# Patient Record
Sex: FEMALE | Race: BLACK OR AFRICAN AMERICAN | Employment: UNEMPLOYED | URBAN - METROPOLITAN AREA
[De-identification: names, ages, dates, MRNs, and addresses within clinical notes are randomized per-mention and may not be internally consistent; named-entity substitution may affect disease eponyms.]

---

## 2024-04-24 PROBLEM — D64.9 SYMPTOMATIC ANEMIA: Status: ACTIVE | Noted: 2024-04-24

## 2024-04-25 ENCOUNTER — TELEPHONE (OUTPATIENT)
Age: 79
End: 2024-04-25

## 2024-04-25 DIAGNOSIS — D51.3 OTHER DIETARY VITAMIN B12 DEFICIENCY ANEMIA: Primary | ICD-10-CM

## 2024-04-25 NOTE — TELEPHONE ENCOUNTER
Called spoke with son Good regarding upcoming opic/ov. Advised time date and location as well as c/b #.

## 2024-04-26 ENCOUNTER — ANESTHESIA EVENT (OUTPATIENT)
Facility: HOSPITAL | Age: 79
End: 2024-04-26
Payer: MEDICARE

## 2024-04-26 ENCOUNTER — ANESTHESIA (OUTPATIENT)
Facility: HOSPITAL | Age: 79
End: 2024-04-26
Payer: MEDICARE

## 2024-04-26 PROCEDURE — 6360000002 HC RX W HCPCS: Performed by: NURSE ANESTHETIST, CERTIFIED REGISTERED

## 2024-04-26 PROCEDURE — 2500000003 HC RX 250 WO HCPCS: Performed by: NURSE ANESTHETIST, CERTIFIED REGISTERED

## 2024-04-26 RX ORDER — LIDOCAINE HYDROCHLORIDE 20 MG/ML
INJECTION, SOLUTION EPIDURAL; INFILTRATION; INTRACAUDAL; PERINEURAL PRN
Status: DISCONTINUED | OUTPATIENT
Start: 2024-04-26 | End: 2024-04-26 | Stop reason: SDUPTHER

## 2024-04-26 RX ADMIN — PROPOFOL 50 MG: 10 INJECTION, EMULSION INTRAVENOUS at 13:31

## 2024-04-26 RX ADMIN — PROPOFOL 40 MG: 10 INJECTION, EMULSION INTRAVENOUS at 13:35

## 2024-04-26 RX ADMIN — LIDOCAINE HYDROCHLORIDE 60 MG: 20 INJECTION, SOLUTION EPIDURAL; INFILTRATION; INTRACAUDAL; PERINEURAL at 13:31

## 2024-04-26 RX ADMIN — PROPOFOL 30 MG: 10 INJECTION, EMULSION INTRAVENOUS at 13:41

## 2024-04-26 RX ADMIN — PROPOFOL 50 MG: 10 INJECTION, EMULSION INTRAVENOUS at 13:33

## 2024-04-26 RX ADMIN — PROPOFOL 30 MG: 10 INJECTION, EMULSION INTRAVENOUS at 13:38

## 2024-04-26 NOTE — ANESTHESIA PRE PROCEDURE
Q8H PRN Leo Ray MD        Or   • ondansetron (ZOFRAN) injection 4 mg  4 mg IntraVENous Q6H PRN Leo Ray MD       • polyethylene glycol (GLYCOLAX) packet 17 g  17 g Oral Daily PRN Leo Ray MD       • acetaminophen (TYLENOL) tablet 650 mg  650 mg Oral Q6H PRN Leo Ray MD        Or   • acetaminophen (TYLENOL) suppository 650 mg  650 mg Rectal Q6H PRN Leo Ray MD       • 0.9 % sodium chloride infusion   IntraVENous Continuous Leo Ray MD 75 mL/hr at 04/26/24 1330 NoRateChange at 04/26/24 1330     Facility-Administered Medications Ordered in Other Encounters   Medication Dose Route Frequency Provider Last Rate Last Admin   • lidocaine PF 2 % injection   IntraVENous PRN Den Perez APRN - CRNA   60 mg at 04/26/24 1331   • propofol infusion   IntraVENous PRN Den Perez APRN - CRNA   50 mg at 04/26/24 1331       Allergies:  No Known Allergies    Problem List:    Patient Active Problem List   Diagnosis Code   • Symptomatic anemia D64.9   • Other dietary vitamin B12 deficiency anemia D51.3       Past Medical History:  History reviewed. No pertinent past medical history.    Past Surgical History:  No past surgical history on file.    Social History:    Social History     Tobacco Use   • Smoking status: Not on file   • Smokeless tobacco: Not on file   Substance Use Topics   • Alcohol use: Not on file                                Counseling given: Not Answered      Vital Signs (Current):   Vitals:    04/26/24 1000 04/26/24 1111 04/26/24 1154 04/26/24 1319   BP:  (!) 147/90  (!) 172/87   Pulse: 56  59 58   Resp:    17   Temp:  98 °F (36.7 °C)     TempSrc:  Oral     SpO2:  100%     Weight:                                                  BP Readings from Last 3 Encounters:   04/26/24 (!) 172/87       NPO Status: Time of last liquid consumption: 1800                        Time of last solid consumption: 0800                        Date of last liquid

## 2024-04-26 NOTE — ANESTHESIA POSTPROCEDURE EVALUATION
Department of Anesthesiology  Postprocedure Note    Patient: Reena Urbina  MRN: [id number]  YOB: 1945  Date of evaluation: 4/26/2024    Procedure Summary       Date: 04/26/24 Room / Location: [facility] ENDO 03 / [facility] ENDOSCOPY    Anesthesia Start: 1330 Anesthesia Stop: 1350    Procedure: ESOPHAGOGASTRODUODENOSCOPY (Upper GI Region) Diagnosis:       Anemia, unspecified type      (Anemia, unspecified type [D64.9])    Surgeons: Celine Dickson MD Responsible Provider: Bright Coley MD    Anesthesia Type: MAC ASA Status: 2            Anesthesia Type: MAC    Rubén Phase I: Rubén Score: 10    Rubén Phase II: Rubén Score: 10    Anesthesia Post Evaluation    Patient location during evaluation: bedside  Nausea & Vomiting: no nausea  Cardiovascular status: blood pressure returned to baseline  Respiratory status: acceptable  Hydration status: euvolemic    No notable events documented.